# Patient Record
Sex: FEMALE | Race: WHITE | HISPANIC OR LATINO | URBAN - METROPOLITAN AREA
[De-identification: names, ages, dates, MRNs, and addresses within clinical notes are randomized per-mention and may not be internally consistent; named-entity substitution may affect disease eponyms.]

---

## 2021-07-14 ENCOUNTER — OFFICE VISIT (OUTPATIENT)
Dept: URBAN - METROPOLITAN AREA CLINIC 105 | Facility: CLINIC | Age: 31
End: 2021-07-14
Payer: COMMERCIAL

## 2021-07-14 ENCOUNTER — WEB ENCOUNTER (OUTPATIENT)
Dept: URBAN - METROPOLITAN AREA CLINIC 105 | Facility: CLINIC | Age: 31
End: 2021-07-14

## 2021-07-14 ENCOUNTER — LAB OUTSIDE AN ENCOUNTER (OUTPATIENT)
Dept: URBAN - METROPOLITAN AREA CLINIC 105 | Facility: CLINIC | Age: 31
End: 2021-07-14

## 2021-07-14 VITALS
TEMPERATURE: 98.1 F | SYSTOLIC BLOOD PRESSURE: 99 MMHG | HEART RATE: 123 BPM | WEIGHT: 132.4 LBS | BODY MASS INDEX: 18.54 KG/M2 | HEIGHT: 71 IN | DIASTOLIC BLOOD PRESSURE: 65 MMHG

## 2021-07-14 DIAGNOSIS — K21.9 GASTROESOPHAGEAL REFLUX DISEASE, UNSPECIFIED WHETHER ESOPHAGITIS PRESENT: ICD-10-CM

## 2021-07-14 DIAGNOSIS — R11.2 NAUSEA WITH VOMITING, UNSPECIFIED: ICD-10-CM

## 2021-07-14 DIAGNOSIS — R19.7 DIARRHEA, UNSPECIFIED: ICD-10-CM

## 2021-07-14 PROCEDURE — 99204 OFFICE O/P NEW MOD 45 MIN: CPT | Performed by: INTERNAL MEDICINE

## 2021-07-14 RX ORDER — ESCITALOPRAM 20 MG/1
TAKE 1 TABLET (20 MG) BY ORAL ROUTE ONCE DAILY TABLET, FILM COATED ORAL 1
Qty: 0 | Refills: 0 | Status: ACTIVE | COMMUNITY
Start: 1900-01-01

## 2021-07-14 RX ORDER — DESOGESTREL AND ETHINYL ESTRADIOL 0.15-0.03
TAKE 1 TABLET BY ORAL ROUTE ONCE DAILY KIT ORAL 1
Qty: 0 | Refills: 0 | Status: ACTIVE | COMMUNITY
Start: 1900-01-01

## 2021-07-14 RX ORDER — FLUOXETINE HYDROCHLORIDE 20 MG/1
1 TABLET TABLET ORAL ONCE A DAY
Status: ACTIVE | COMMUNITY

## 2021-07-14 RX ORDER — ONDANSETRON HYDROCHLORIDE 4 MG/1
1 TABLET TABLET, FILM COATED ORAL ONCE A DAY
Status: ACTIVE | COMMUNITY

## 2021-07-14 RX ORDER — SPIRONOLACTONE 100 MG/1
TABLET, FILM COATED ORAL
Qty: 0 | Refills: 0 | Status: ACTIVE | COMMUNITY
Start: 1900-01-01

## 2021-07-14 RX ORDER — PROMETHAZINE HYDROCHLORIDE AND CODEINE PHOSPHATE 6.25; 1 MG/5ML; MG/5ML
AS DIRECTED SOLUTION ORAL
Status: ACTIVE | COMMUNITY

## 2021-07-14 RX ORDER — MELATONIN 3 MG
1 TABLET AT BEDTIME AS NEEDED TABLET ORAL ONCE A DAY
Status: ACTIVE | COMMUNITY

## 2021-07-14 NOTE — HPI-TODAY'S VISIT:
The patient presents for intermittent nausea/vomiting.  Today, the patient notes a history of intermittent bouts of nausea/vomiting for the past 4-5 years, with a recurrence on July 6 (prior episode was Jan 2020). Before Jan 2020, she had these n/v episodes every few months. She previously smoked marijuana QD, but cut down on use at the recommendation of her [place] Valdez - now smokes 1x/couple weeks, last use on July 4th. She presented to the ER on July 7.  Zofran 4 mg 1 pill does not work she states. Oral promethazine also does not provide relief, but the promethazine supp helps (makes pt sleepy). An H. pylori breath test She is feeling better today. At the beginning of her bout with nausea/vomiting, she felt constipated, but started having diarrhea 2 days ago - [place] Valdez ordered a stool study. She reports heartburn - [place] prescribed omeprazole; however, there are issues getting it from the pharmacy, so the patient has taken Zantac (old med already on hand at home, unsure of dosage) once on Monday.   Her paternal uncle passed from colon cancer in his 40s. Her brother had serrated colon polyps she states.  Labs 8/25/10 - Celiac panel negative.

## 2021-07-15 ENCOUNTER — LAB OUTSIDE AN ENCOUNTER (OUTPATIENT)
Dept: URBAN - METROPOLITAN AREA CLINIC 105 | Facility: CLINIC | Age: 31
End: 2021-07-15

## 2021-07-22 LAB — GASTROINTESTINAL PATHOGEN: (no result)

## 2021-07-30 ENCOUNTER — TELEPHONE ENCOUNTER (OUTPATIENT)
Dept: URBAN - METROPOLITAN AREA CLINIC 92 | Facility: CLINIC | Age: 31
End: 2021-07-30

## 2021-07-30 ENCOUNTER — OFFICE VISIT (OUTPATIENT)
Dept: URBAN - METROPOLITAN AREA SURGERY CENTER 16 | Facility: SURGERY CENTER | Age: 31
End: 2021-07-30
Payer: COMMERCIAL

## 2021-07-30 ENCOUNTER — CLAIMS CREATED FROM THE CLAIM WINDOW (OUTPATIENT)
Dept: URBAN - METROPOLITAN AREA CLINIC 4 | Facility: CLINIC | Age: 31
End: 2021-07-30
Payer: COMMERCIAL

## 2021-07-30 DIAGNOSIS — K21.9 GASTRO-ESOPHAGEAL REFLUX DISEASE WITHOUT ESOPHAGITIS: ICD-10-CM

## 2021-07-30 DIAGNOSIS — K21.9 ACID REFLUX: ICD-10-CM

## 2021-07-30 DIAGNOSIS — K31.89 GASTRIC FOVEOLAR HYPERPLASIA: ICD-10-CM

## 2021-07-30 PROCEDURE — 88305 TISSUE EXAM BY PATHOLOGIST: CPT | Performed by: PATHOLOGY

## 2021-07-30 PROCEDURE — G8907 PT DOC NO EVENTS ON DISCHARG: HCPCS | Performed by: INTERNAL MEDICINE

## 2021-07-30 PROCEDURE — 88312 SPECIAL STAINS GROUP 1: CPT | Performed by: PATHOLOGY

## 2021-07-30 PROCEDURE — 43239 EGD BIOPSY SINGLE/MULTIPLE: CPT | Performed by: INTERNAL MEDICINE

## 2021-07-30 RX ORDER — ONDANSETRON HYDROCHLORIDE 4 MG/1
1 TABLET TABLET, FILM COATED ORAL ONCE A DAY
Status: ACTIVE | COMMUNITY

## 2021-07-30 RX ORDER — SODIUM, POTASSIUM,MAG SULFATES 17.5-3.13G
177 ML SOLUTION, RECONSTITUTED, ORAL ORAL ONCE
Qty: 177 ML | Refills: 0 | OUTPATIENT
Start: 2021-07-30 | End: 2021-07-31

## 2021-07-30 RX ORDER — MELATONIN 3 MG
1 TABLET AT BEDTIME AS NEEDED TABLET ORAL ONCE A DAY
Status: ACTIVE | COMMUNITY

## 2021-07-30 RX ORDER — ESCITALOPRAM 20 MG/1
TAKE 1 TABLET (20 MG) BY ORAL ROUTE ONCE DAILY TABLET, FILM COATED ORAL 1
Qty: 0 | Refills: 0 | Status: ACTIVE | COMMUNITY
Start: 1900-01-01

## 2021-07-30 RX ORDER — DESOGESTREL AND ETHINYL ESTRADIOL 0.15-0.03
TAKE 1 TABLET BY ORAL ROUTE ONCE DAILY KIT ORAL 1
Qty: 0 | Refills: 0 | Status: ACTIVE | COMMUNITY
Start: 1900-01-01

## 2021-07-30 RX ORDER — SPIRONOLACTONE 100 MG/1
TABLET, FILM COATED ORAL
Qty: 0 | Refills: 0 | Status: ACTIVE | COMMUNITY
Start: 1900-01-01

## 2021-07-30 RX ORDER — FLUOXETINE HYDROCHLORIDE 20 MG/1
1 TABLET TABLET ORAL ONCE A DAY
Status: ACTIVE | COMMUNITY

## 2021-07-30 RX ORDER — PROMETHAZINE HYDROCHLORIDE AND CODEINE PHOSPHATE 6.25; 1 MG/5ML; MG/5ML
AS DIRECTED SOLUTION ORAL
Status: ACTIVE | COMMUNITY

## 2022-03-01 ENCOUNTER — DASHBOARD ENCOUNTERS (OUTPATIENT)
Age: 32
End: 2022-03-01

## 2022-03-02 ENCOUNTER — OFFICE VISIT (OUTPATIENT)
Dept: URBAN - METROPOLITAN AREA CLINIC 105 | Facility: CLINIC | Age: 32
End: 2022-03-02
Payer: COMMERCIAL

## 2022-03-02 ENCOUNTER — WEB ENCOUNTER (OUTPATIENT)
Dept: URBAN - METROPOLITAN AREA CLINIC 105 | Facility: CLINIC | Age: 32
End: 2022-03-02

## 2022-03-02 DIAGNOSIS — R11.10 CHRONIC VOMITING: ICD-10-CM

## 2022-03-02 PROCEDURE — 99214 OFFICE O/P EST MOD 30 MIN: CPT | Performed by: INTERNAL MEDICINE

## 2022-03-02 RX ORDER — ESCITALOPRAM 20 MG/1
TAKE 1 TABLET (20 MG) BY ORAL ROUTE ONCE DAILY TABLET, FILM COATED ORAL 1
Qty: 0 | Refills: 0 | Status: ACTIVE | COMMUNITY
Start: 1900-01-01

## 2022-03-02 RX ORDER — FLUOXETINE HYDROCHLORIDE 20 MG/1
1 TABLET TABLET ORAL ONCE A DAY
Status: ACTIVE | COMMUNITY

## 2022-03-02 RX ORDER — PROMETHAZINE HYDROCHLORIDE AND CODEINE PHOSPHATE 6.25; 1 MG/5ML; MG/5ML
AS DIRECTED SOLUTION ORAL
Status: ON HOLD | COMMUNITY

## 2022-03-02 RX ORDER — SPIRONOLACTONE 100 MG/1
TABLET, FILM COATED ORAL
Qty: 0 | Refills: 0 | Status: ACTIVE | COMMUNITY
Start: 1900-01-01

## 2022-03-02 RX ORDER — MELATONIN 3 MG
1 TABLET AT BEDTIME AS NEEDED TABLET ORAL ONCE A DAY
Status: ACTIVE | COMMUNITY

## 2022-03-02 RX ORDER — ONDANSETRON HYDROCHLORIDE 4 MG/1
1 TABLET TABLET, FILM COATED ORAL ONCE A DAY
Status: ACTIVE | COMMUNITY

## 2022-03-02 RX ORDER — PROMETHAZINE HYDROCHLORIDE 25 MG/1
1 TABLET AS NEEDED TABLET ORAL
Qty: 60 | OUTPATIENT
Start: 2022-03-02 | End: 2022-04-01

## 2022-03-02 RX ORDER — DESOGESTREL AND ETHINYL ESTRADIOL 0.15-0.03
TAKE 1 TABLET BY ORAL ROUTE ONCE DAILY KIT ORAL 1
Qty: 0 | Refills: 0 | Status: ACTIVE | COMMUNITY
Start: 1900-01-01

## 2022-03-02 NOTE — HPI-TODAY'S VISIT:
- last vomiting ws last Thursday - still smoking pot - going to [place] - wants to become pregnant  - so far nothing has been helping - please see prior notes from Dr. Baker, his patient originally

## 2022-03-05 ENCOUNTER — TELEPHONE ENCOUNTER (OUTPATIENT)
Dept: URBAN - METROPOLITAN AREA CLINIC 105 | Facility: CLINIC | Age: 32
End: 2022-03-05

## 2022-03-05 RX ORDER — PROMETHAZINE HYDROCHLORIDE 25 MG/1
1 TABLET AS NEEDED TABLET ORAL
Qty: 20 | Refills: 0 | OUTPATIENT
Start: 2022-03-05 | End: 2022-04-04

## 2022-03-08 ENCOUNTER — TELEPHONE ENCOUNTER (OUTPATIENT)
Dept: URBAN - METROPOLITAN AREA CLINIC 105 | Facility: CLINIC | Age: 32
End: 2022-03-08

## 2022-03-08 RX ORDER — OMEPRAZOLE 40 MG/1
1 CAPSULE 30 MINUTES BEFORE MORNING MEAL CAPSULE, DELAYED RELEASE ORAL ONCE A DAY
Qty: 30 | Refills: 2 | OUTPATIENT
Start: 2022-03-08

## 2022-05-10 ENCOUNTER — TELEPHONE ENCOUNTER (OUTPATIENT)
Dept: URBAN - METROPOLITAN AREA CLINIC 92 | Facility: CLINIC | Age: 32
End: 2022-05-10

## 2022-05-10 RX ORDER — PROMETHAZINE HYDROCHLORIDE 25 MG/1
1 TABLET TABLET ORAL
Qty: 30 | Refills: 1 | OUTPATIENT
Start: 2022-03-05 | End: 2022-07-09

## 2022-07-01 ENCOUNTER — TELEPHONE ENCOUNTER (OUTPATIENT)
Dept: URBAN - METROPOLITAN AREA CLINIC 105 | Facility: CLINIC | Age: 32
End: 2022-07-01